# Patient Record
Sex: FEMALE | Race: WHITE | NOT HISPANIC OR LATINO | Employment: FULL TIME | URBAN - METROPOLITAN AREA
[De-identification: names, ages, dates, MRNs, and addresses within clinical notes are randomized per-mention and may not be internally consistent; named-entity substitution may affect disease eponyms.]

---

## 2023-12-21 ENCOUNTER — OFFICE VISIT (OUTPATIENT)
Dept: PRIMARY CARE | Facility: CLINIC | Age: 38
End: 2023-12-21
Payer: COMMERCIAL

## 2023-12-21 VITALS — DIASTOLIC BLOOD PRESSURE: 76 MMHG | HEART RATE: 76 BPM | SYSTOLIC BLOOD PRESSURE: 123 MMHG

## 2023-12-21 DIAGNOSIS — F41.1 GAD (GENERALIZED ANXIETY DISORDER): Primary | ICD-10-CM

## 2023-12-21 PROBLEM — Z98.82 S/P BREAST AUGMENTATION: Status: ACTIVE | Noted: 2023-12-21

## 2023-12-21 PROCEDURE — 1036F TOBACCO NON-USER: CPT | Performed by: FAMILY MEDICINE

## 2023-12-21 PROCEDURE — 99213 OFFICE O/P EST LOW 20 MIN: CPT | Performed by: FAMILY MEDICINE

## 2023-12-21 RX ORDER — VENLAFAXINE HYDROCHLORIDE 75 MG/1
75 CAPSULE, EXTENDED RELEASE ORAL DAILY
Qty: 30 CAPSULE | Refills: 0 | Status: SHIPPED | OUTPATIENT
Start: 2023-12-21 | End: 2024-01-23 | Stop reason: SDUPTHER

## 2023-12-21 NOTE — PROGRESS NOTES
Subjective   Patient ID: Courtney Ace is a 38 y.o. female who presents for anxiety    HPI   Anxiety got worse lately. Buspar did not help anxiety relief. pt denies depressed mood.  Normal appetite. No  heart palpitation or LE edema. No HA, dizziness, imbalance.    Review of Systems    Objective   /76   Pulse 76     Physical Exam  NAD, well groomed, No sclera icterus.  lungs: CTA b/l, heart: RRR, no LE edema, abd: soft, no tenderness, BS+,  Good balance. No depressed mood  Assessment/Plan   Problem List Items Addressed This Visit             ICD-10-CM    GRUPO (generalized anxiety disorder) - Primary F41.1     Start effexor. Check labs. Fu in 1 mos         Relevant Medications    venlafaxine XR (Effexor-XR) 75 mg 24 hr capsule    Other Relevant Orders    CBC    Comprehensive Metabolic Panel    TSH with reflex to Free T4 if abnormal

## 2024-01-23 ENCOUNTER — OFFICE VISIT (OUTPATIENT)
Dept: PRIMARY CARE | Facility: CLINIC | Age: 39
End: 2024-01-23
Payer: COMMERCIAL

## 2024-01-23 VITALS — DIASTOLIC BLOOD PRESSURE: 71 MMHG | SYSTOLIC BLOOD PRESSURE: 123 MMHG

## 2024-01-23 DIAGNOSIS — F41.1 GAD (GENERALIZED ANXIETY DISORDER): ICD-10-CM

## 2024-01-23 PROCEDURE — 1036F TOBACCO NON-USER: CPT | Performed by: FAMILY MEDICINE

## 2024-01-23 PROCEDURE — 99213 OFFICE O/P EST LOW 20 MIN: CPT | Performed by: FAMILY MEDICINE

## 2024-01-23 RX ORDER — VENLAFAXINE HYDROCHLORIDE 75 MG/1
75 CAPSULE, EXTENDED RELEASE ORAL DAILY
Qty: 90 CAPSULE | Refills: 0 | Status: SHIPPED | OUTPATIENT
Start: 2024-01-23 | End: 2024-01-28

## 2024-01-23 NOTE — PROGRESS NOTES
Subjective   Patient ID: Courtney Ace is a 38 y.o. female who presents for fu    HPI   pt denies anxiety or depressed mood  while on effexor. Normal appetite with good sleep now. No  heart palpitation, HA, dizziness, imbalance.    Review of Systems    Objective   /71     Physical Exam  NAD, well groomed, No sclera icterus.   lungs: CTA b/l, heart: RRR, no LE edema, abd: soft, no tenderness, BS+,  Good balance. No depressed mood    Assessment/Plan     Skinny,  well controlled with zoloft.  Cont. the same. f/u in 3 mos for cpe      .

## 2024-01-28 DIAGNOSIS — F41.1 GAD (GENERALIZED ANXIETY DISORDER): ICD-10-CM

## 2024-01-28 RX ORDER — VENLAFAXINE HYDROCHLORIDE 75 MG/1
75 CAPSULE, EXTENDED RELEASE ORAL DAILY
Qty: 90 CAPSULE | Refills: 0 | Status: SHIPPED | OUTPATIENT
Start: 2024-01-28 | End: 2024-04-23 | Stop reason: SDUPTHER

## 2024-04-23 ENCOUNTER — OFFICE VISIT (OUTPATIENT)
Dept: PRIMARY CARE | Facility: CLINIC | Age: 39
End: 2024-04-23
Payer: COMMERCIAL

## 2024-04-23 VITALS
HEART RATE: 66 BPM | RESPIRATION RATE: 14 BRPM | BODY MASS INDEX: 29.1 KG/M2 | SYSTOLIC BLOOD PRESSURE: 115 MMHG | WEIGHT: 192 LBS | DIASTOLIC BLOOD PRESSURE: 64 MMHG | HEIGHT: 68 IN

## 2024-04-23 DIAGNOSIS — R19.5 LOOSE STOOLS: ICD-10-CM

## 2024-04-23 DIAGNOSIS — Z00.00 ROUTINE MEDICAL EXAM: Primary | ICD-10-CM

## 2024-04-23 DIAGNOSIS — F41.1 GAD (GENERALIZED ANXIETY DISORDER): ICD-10-CM

## 2024-04-23 PROCEDURE — 90471 IMMUNIZATION ADMIN: CPT | Performed by: FAMILY MEDICINE

## 2024-04-23 PROCEDURE — 1036F TOBACCO NON-USER: CPT | Performed by: FAMILY MEDICINE

## 2024-04-23 PROCEDURE — 99395 PREV VISIT EST AGE 18-39: CPT | Performed by: FAMILY MEDICINE

## 2024-04-23 PROCEDURE — 90715 TDAP VACCINE 7 YRS/> IM: CPT | Performed by: FAMILY MEDICINE

## 2024-04-23 RX ORDER — VENLAFAXINE HYDROCHLORIDE 75 MG/1
75 CAPSULE, EXTENDED RELEASE ORAL DAILY
Qty: 90 CAPSULE | Refills: 0 | Status: SHIPPED | OUTPATIENT
Start: 2024-04-23

## 2024-04-23 NOTE — PROGRESS NOTES
pt has regular dental visits. no vision problems. no hearing loss.   Lifestyle: healthy diet. + weight concerns. Pt exercises regularly. no tobacco or alcohol abuse.   Safety elements used: seat belt, safe driving habits and smoke detector.   No passive smoke exposure, chemical abuse, domestic violence, anxiety symptoms, depression symptoms.  Pt has safe sexual behavior, safe driving habits. No driving violations, history of DUI.  No tuberculosis exposure.   Reproductive health: the patient is premenopausal. she reports normal menses. she uses no contraception. she is sexually active.   Cervical cancer screening:. patient has no history of an abnormal pap smear.   Review of Systems  Constitutional: no chills, no fever and no night sweats.   Eyes: no blurred vision and no eyesight problems.   ENT: no hearing loss, no nasal congestion, no nasal discharge, no hoarseness and no sore throat.   Neck: no mass(es) and no swelling.   Cardiovascular: no chest pain, no intermittent leg claudication, no lower extremity edema, no palpitations and no syncope.   Respiratory: no cough, no shortness of breath during exertion, no shortness of breath at rest and no wheezing.   Gastrointestinal: no abdominal pain, occ blood in stools, no constipation, thin or loose stools daily, no melena, no nausea, no rectal pain and no vomiting.   Genitourinary: no unexplained vaginal bleeding, no dysuria, no change in urinary frequency, no genital lesions, no hematuria, no urinary hesitancy, no incontinence, no pelvic pain, no feelings of urinary urgency and no vaginal discharge.   Musculoskeletal: no arthralgias, occ low  back pain, no localized joint pain, no myalgias and no neck pain.   Integumentary: no new skin lesions, no nipple discharge, no rashes and no skin wound.   Neurological: no confusion, no convulsions, no difficulty walking, no headache, no limb weakness, no memory changes, no numbness, no speech difficulties, no syncope and no  tingling.   Psychiatric: no anxiety, no personality change, no depression, no emotional problems, no homicidal thoughts, no anhedonia, no sleep disturbances and no substance use disorders.   Endocrine: no changes in appetite, no deepening of the voice, no polyuria, no feelings of weakness, no heat/cold intolerance, no muscle weakness, no polydipsia, no recent weight gain and no recent weight loss.   Hematologic/Lymphatic: no tendency for easy bleeding, no tendency for easy bruising, no recurrent infections and no swollen glands.     Physical Exam  Constitutional: Alert and in no acute distress. Well developed, well nourished.   Head and Face: Head and face: Normal.  Palpation of the face and sinuses: Normal.    Eyes: Normal external exam. Pupils: PERRL with normal accommodation and EOMI.   Ears, Nose, Mouth, and Throat: External inspection of ears and nose: Normal.  Hearing: Normal.  Nasal mucosa, septum, and turbinates: Normal.  Oropharynx: Normal.    Neck: No neck mass was observed. Supple. Thyroid not enlarged and there were no palpable thyroid nodules.   Cardiovascular: Heart rate and rhythm were normal, normal S1 and S2, no gallops, no murmurs and no pericardial rub. Pedal pulses: Normal. No peripheral edema.   Pulmonary: No respiratory distress. Clear bilateral breath sounds.   Chest wall: Normal.    Abdomen: Soft nontender; no abdominal mass palpated. No organomegaly. No hernias.   Musculoskeletal: Gait and station: Normal. No joint swelling seen, normal movements of all extremities. Range of motion: Normal.  Muscle strength/tone: Normal.    Skin: Normal skin color and pigmentation, normal skin turgor, and no rash. Palpation of skin and subcutaneous tissue: Normal.    Neurologic: Cranial nerves 2-12 grossly intact. Deep tendon reflexes were 2+ and symmetric at the knees. Sensation: Normal. Coordination: Normal.    Psychiatric: Judgment and insight: Intact. Alert and oriented x 3. Recent and remote memory:  Normal.  Mood and affect: Normal.   Lymphatic: No cervical lymphadenopathy. Palpation of lymph nodes in axillae: Normal.      unremarkable PE except overweight. recommend nutritionist eval. Recommend DASH diet and regular exercise. check CBC, CMP, lipid, TSH.  Advise eye exam by an OD yearly and dental exam every 6 months. will monitor lipid and weight yearly. Recommend sunscreen application if exposed to the sun when UV index is above 2.   recommend Hep C, hepB, HIV test. recommend   Tdap,  covid shot.   We will call pt regarding lab results. f/u as scheduled.

## 2024-06-18 ENCOUNTER — APPOINTMENT (OUTPATIENT)
Dept: GASTROENTEROLOGY | Facility: CLINIC | Age: 39
End: 2024-06-18
Payer: COMMERCIAL

## 2024-06-18 VITALS
BODY MASS INDEX: 28.95 KG/M2 | DIASTOLIC BLOOD PRESSURE: 78 MMHG | WEIGHT: 191 LBS | HEART RATE: 79 BPM | HEIGHT: 68 IN | SYSTOLIC BLOOD PRESSURE: 116 MMHG | OXYGEN SATURATION: 98 %

## 2024-06-18 DIAGNOSIS — R19.5 LOOSE STOOLS: ICD-10-CM

## 2024-06-18 DIAGNOSIS — K62.5 RECTAL BLEEDING: Primary | ICD-10-CM

## 2024-06-18 PROCEDURE — 99204 OFFICE O/P NEW MOD 45 MIN: CPT | Performed by: PHYSICIAN ASSISTANT

## 2024-06-18 PROCEDURE — 1036F TOBACCO NON-USER: CPT | Performed by: PHYSICIAN ASSISTANT

## 2024-06-18 RX ORDER — POLYETHYLENE GLYCOL 3350, SODIUM SULFATE ANHYDROUS, SODIUM BICARBONATE, SODIUM CHLORIDE, POTASSIUM CHLORIDE 236; 22.74; 6.74; 5.86; 2.97 G/4L; G/4L; G/4L; G/4L; G/4L
4000 POWDER, FOR SOLUTION ORAL ONCE
Qty: 4000 ML | Refills: 0 | Status: SHIPPED | OUTPATIENT
Start: 2024-06-18 | End: 2024-06-18

## 2024-06-18 NOTE — PROGRESS NOTES
Subjective   Patient ID: Courtney Ace is a 39 y.o. female who was referred by her PCP for New Patient Visit (Bm/blood in stool ).    Patient's PCP is Dr. Quintero    PMH: GRUPO    HPI  Patient states that she has been having irregular stools and rectal bleeding for the past few months. She states that generally she is having 1 BM daily. She states that it can be a skinny formed stool or watery diarrhea. She states that the diarrhea is usually larger volume. She denies nocturnal diarrhea. She states that in the past she had intermittent constipation that she used OTC colace PRN for. She has had some diffuse abdominal discomfort and bloating. She has noticed that dairy products tend to be a trigger for her symptoms. She states that the bleeding is intermittent. She states that it is bright red in the toilet and with wiping. She states that she thinks that she has hemorrhoids. She denies unexplained weight loss, dysphagia, N/V, melena, rashes, vision changes. She has never had endoscopy or GI work up before. She states that her mother had colon polyps and she has an aunt with history of rectal cancer.     PCP ordered basic labs, including TSH, CBC, and CMP, which are pending.     Prior GI evaluation:  None    Review of Systems:  Constitutional: No reported fever, chills, or weight loss.  Skin: No reported icterus, lesions, or rash.  Eye: No reported itching, pain, vision changes.  Ear: No reported discharge, hearing loss, or pain.  Nose: No reported congestion, discharge, or epistaxis.  Mouth/throat: No reported dysphagia, hoarseness, or throat pain.  Resp: No reported cough, dyspnea, or wheezing.  Cardiovascular: No reported chest pain, lower extremity edema, or palpitations.   GI: Reports abdominal pain, several months of irregular stool, and rectal bleeding.  : No reported dysuria, hematuria, or frequency.  Neuro: No reported confusion, memory loss, headaches, or dizziness.  Psych: No reported anxiety, depression, or  insomnia.  Musculoskeletal: No reported arthralgia, joint swelling, or myalgias.  Heme/lymph: No reported easy bleeding or bruising, or swollen lymph nodes.  Endocrine: No reported cold/heat intolerance, polydipsia, or polyuria.     Medications:  Prior to Admission medications    Medication Sig Start Date End Date Taking? Authorizing Provider   venlafaxine XR (Effexor-XR) 75 mg 24 hr capsule Take 1 capsule (75 mg) by mouth once daily. 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    Past Surgical History:  She has a past surgical history that includes Other surgical history (01/04/2016) and Other surgical history (03/26/2021).    Social History:  She reports that she quit smoking about 10 years ago. Her smoking use included cigarettes. She has never used smokeless tobacco. She reports that she does not currently use alcohol. She reports that she does not use drugs.    Objective   Physical exam:  Constitutional: Well developed, well  "nourished 39 y.o. year old in no acute distress.   Skin: Warm and dry. Normal turgor. No rash, ulcer, trauma, jaundice.   Eyes: Pupils symmetric and reactive to light.  Respiratory: Clear to auscultation bilaterally. No wheezes, rhonchi, or rales heard.  Cardiovascular: Regular rate and rhythm. S1 and S2 appreciated and normal. No murmur, rub, or gallop heard.   Edema: No edema noted.  GI: Normal bowel sounds. Soft, non-distended, non-tender. No rebound or guarding present. No hepatomegaly or splenomegaly appreciated. Abdominal aorta not palpably abnormal.  Musculoskeletal: Limbs and Joints grossly normal. Full range of motion in major joint.   Neuro: Alert and oriented x 3. Cranial nerves 2-12 grossly intact.   Psych: Normal mood and affect.        Relevant Results Recent labs reviewed in the EMR.  Lab Results   Component Value Date    HGB 13.7 07/23/2021    HGB 14.1 04/09/2021    MCV 95 07/23/2021     04/09/2021     07/23/2021     04/09/2021       No results found for: \"FERRITIN\", \"IRON\"    Lab Results   Component Value Date     07/23/2021    K 4.6 07/23/2021     07/23/2021    BUN 9 07/23/2021    CREATININE 0.63 07/23/2021       Lab Results   Component Value Date    BILITOT 1.1 07/23/2021     Lab Results   Component Value Date    ALT 14 07/23/2021    ALT 14 04/09/2021    AST 14 07/23/2021    AST 19 04/09/2021    ALKPHOS 64 07/23/2021    ALKPHOS 62 04/09/2021       No results found for: \"CRP\"    No results found for: \"CALPS\"    Radiology: Reviewed imaging reviewed in the EMR.  No results found.    Assessment/Plan   Problem List Items Addressed This Visit             ICD-10-CM    Rectal bleeding - Primary K62.5     Patient with red blood in the stool and with wiping. Ddx includes hemorrhoids, anal fissure, polyp/lesion. Inflammatory process possible as well. Colonoscopy ordered for further evaluation.          Relevant Medications    Golytely 236-22.74-6.74 -5.86 gram solution    " Other Relevant Orders    C. difficile, PCR    Calprotectin, Fecal    C-Reactive Protein    Pancreatic Elastase, Fecal    Thyroid Stimulating Hormone    Tissue Transglutaminase IgA    Colonoscopy Diagnostic    Loose stools R19.5     Patient with months of irregular bowel habits. Discussed that symptoms could represent IBS given background of constipation. I will evaluate for other possibilities including celiac disease, pancreatic insufficiency, infection, inflammation. Lab work and stool studies ordered along with colonoscopy. Recommended daily fiber supplement.         Relevant Medications    Golytely 236-22.74-6.74 -5.86 gram solution    Other Relevant Orders    C. difficile, PCR    Calprotectin, Fecal    C-Reactive Protein    Pancreatic Elastase, Fecal    Thyroid Stimulating Hormone    Tissue Transglutaminase IgA    Colonoscopy Diagnostic         Mariola Holland PA-C

## 2024-06-18 NOTE — ASSESSMENT & PLAN NOTE
Patient with months of irregular bowel habits. Discussed that symptoms could represent IBS given background of constipation. I will evaluate for other possibilities including celiac disease, pancreatic insufficiency, infection, inflammation. Lab work and stool studies ordered along with colonoscopy. Recommended daily fiber supplement.

## 2024-06-18 NOTE — ASSESSMENT & PLAN NOTE
Patient with red blood in the stool and with wiping. Ddx includes hemorrhoids, anal fissure, polyp/lesion. Inflammatory process possible as well. Colonoscopy ordered for further evaluation.

## 2024-06-18 NOTE — PATIENT INSTRUCTIONS
Thank you for coming in for your appointment.   -Get labs and stool studies done  -Get colonoscopy done  -Try daily fiber supplement, such as metamucil or benefiber    Call [phone] with questions or concerns.

## 2024-06-26 ENCOUNTER — HOSPITAL ENCOUNTER (OUTPATIENT)
Dept: GASTROENTEROLOGY | Facility: HOSPITAL | Age: 39
Discharge: HOME | End: 2024-06-26
Payer: COMMERCIAL

## 2024-06-26 ENCOUNTER — ANESTHESIA (OUTPATIENT)
Dept: GASTROENTEROLOGY | Facility: HOSPITAL | Age: 39
End: 2024-06-26
Payer: COMMERCIAL

## 2024-06-26 ENCOUNTER — ANESTHESIA EVENT (OUTPATIENT)
Dept: GASTROENTEROLOGY | Facility: HOSPITAL | Age: 39
End: 2024-06-26
Payer: COMMERCIAL

## 2024-06-26 VITALS
SYSTOLIC BLOOD PRESSURE: 100 MMHG | TEMPERATURE: 97.2 F | OXYGEN SATURATION: 99 % | DIASTOLIC BLOOD PRESSURE: 72 MMHG | HEART RATE: 75 BPM | RESPIRATION RATE: 14 BRPM

## 2024-06-26 DIAGNOSIS — K62.5 RECTAL BLEEDING: ICD-10-CM

## 2024-06-26 DIAGNOSIS — R19.5 LOOSE STOOLS: Primary | ICD-10-CM

## 2024-06-26 PROCEDURE — 7100000010 HC PHASE TWO TIME - EACH INCREMENTAL 1 MINUTE

## 2024-06-26 PROCEDURE — 2500000005 HC RX 250 GENERAL PHARMACY W/O HCPCS: Performed by: NURSE ANESTHETIST, CERTIFIED REGISTERED

## 2024-06-26 PROCEDURE — 45378 DIAGNOSTIC COLONOSCOPY: CPT | Performed by: INTERNAL MEDICINE

## 2024-06-26 PROCEDURE — 7100000009 HC PHASE TWO TIME - INITIAL BASE CHARGE

## 2024-06-26 PROCEDURE — 3700000002 HC GENERAL ANESTHESIA TIME - EACH INCREMENTAL 1 MINUTE

## 2024-06-26 PROCEDURE — 2500000004 HC RX 250 GENERAL PHARMACY W/ HCPCS (ALT 636 FOR OP/ED): Performed by: NURSE ANESTHETIST, CERTIFIED REGISTERED

## 2024-06-26 PROCEDURE — 3700000001 HC GENERAL ANESTHESIA TIME - INITIAL BASE CHARGE

## 2024-06-26 RX ORDER — PROPOFOL 10 MG/ML
INJECTION, EMULSION INTRAVENOUS AS NEEDED
Status: DISCONTINUED | OUTPATIENT
Start: 2024-06-26 | End: 2024-06-26

## 2024-06-26 RX ORDER — SODIUM CHLORIDE 9 MG/ML
20 INJECTION, SOLUTION INTRAVENOUS CONTINUOUS
Status: DISCONTINUED | OUTPATIENT
Start: 2024-06-26 | End: 2024-06-27 | Stop reason: HOSPADM

## 2024-06-26 RX ORDER — SODIUM CHLORIDE 9 MG/ML
INJECTION, SOLUTION INTRAVENOUS CONTINUOUS PRN
Status: DISCONTINUED | OUTPATIENT
Start: 2024-06-26 | End: 2024-06-26

## 2024-06-26 RX ORDER — LIDOCAINE HYDROCHLORIDE 20 MG/ML
INJECTION, SOLUTION INFILTRATION; PERINEURAL AS NEEDED
Status: DISCONTINUED | OUTPATIENT
Start: 2024-06-26 | End: 2024-06-26

## 2024-06-26 RX ORDER — FENTANYL CITRATE 50 UG/ML
INJECTION, SOLUTION INTRAMUSCULAR; INTRAVENOUS AS NEEDED
Status: DISCONTINUED | OUTPATIENT
Start: 2024-06-26 | End: 2024-06-26

## 2024-06-26 SDOH — HEALTH STABILITY: MENTAL HEALTH: CURRENT SMOKER: 0

## 2024-06-26 ASSESSMENT — COLUMBIA-SUICIDE SEVERITY RATING SCALE - C-SSRS
1. IN THE PAST MONTH, HAVE YOU WISHED YOU WERE DEAD OR WISHED YOU COULD GO TO SLEEP AND NOT WAKE UP?: NO
6. HAVE YOU EVER DONE ANYTHING, STARTED TO DO ANYTHING, OR PREPARED TO DO ANYTHING TO END YOUR LIFE?: NO
2. HAVE YOU ACTUALLY HAD ANY THOUGHTS OF KILLING YOURSELF?: NO

## 2024-06-26 ASSESSMENT — PAIN SCALES - GENERAL
PAINLEVEL_OUTOF10: 0 - NO PAIN
PAINLEVEL_OUTOF10: 0 - NO PAIN
PAIN_LEVEL: 0
PAINLEVEL_OUTOF10: 2
PAINLEVEL_OUTOF10: 0 - NO PAIN

## 2024-06-26 ASSESSMENT — PAIN - FUNCTIONAL ASSESSMENT: PAIN_FUNCTIONAL_ASSESSMENT: 0-10

## 2024-06-26 NOTE — ANESTHESIA POSTPROCEDURE EVALUATION
Patient: Courtney Ace    Procedure Summary       Date: 06/26/24 Room / Location: [facility]    Anesthesia Start: 1048 Anesthesia Stop: 1127    Procedure: COLONOSCOPY Diagnosis:       Loose stools      Rectal bleeding    Scheduled Providers: Hunter Burris MD Responsible Provider: ARLETH Montero    Anesthesia Type: MAC ASA Status: 2            Anesthesia Type: MAC    Vitals Value Taken Time   /68 06/26/24 1127   Temp 36.1 °C (97 °F) 06/26/24 1127   Pulse 81 06/26/24 1127   Resp 16 06/26/24 1127   SpO2 98 % 06/26/24 1127       Anesthesia Post Evaluation    Patient location during evaluation: bedside  Patient participation: complete - patient participated  Level of consciousness: awake and alert  Pain score: 0  Pain management: adequate  Airway patency: patent  Cardiovascular status: acceptable and hemodynamically stable  Respiratory status: acceptable  Hydration status: acceptable  Postoperative Nausea and Vomiting: none    There were no known notable events for this encounter.

## 2024-06-26 NOTE — H&P
History Of Present Illness  Courtney Ace is a 39 y.o. female presenting with complaints of change in bowel movement.  Diarrhea alternating with formed stools.  She has also noted occasional blood in stool.  This is associated with abdominal discomfort but not gertrude pain.  There is no report of fever.  She denied weight loss nausea or vomiting.  Family history is notable for: Polyp in her mother and colon cancer in 1 aunt..     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    Surgical History  She has a past surgical history that includes Other surgical history (01/04/2016) and Other surgical history (03/26/2021).     Social History  She reports that she quit smoking about 10 years ago. Her smoking use included cigarettes. She has never used smokeless tobacco. She reports that she does not currently use alcohol. She reports that she does not use drugs.    Family History  Family History   Problem Relation Name Age of Onset    No Known Problems Mother      No Known Problems Father      Colon cancer  Mother's Sister          Allergies  Bupropion    Review of Systems  Constitutional: no fever, no chills, fatigue,  not feeling tired and no recent weight loss. No reports of dizziness.  SKIN: No skin rash or lesions  EYE: No pain photophobia, diplopia or blurred vision  EAR: No discharge, pain or hearing loss  NOSE: No congestion, epistaxis or obstruction  RESPIRATORY: No cough , dyspnea or  chest pain   CV: No chest pain, lower extremity swelling or palpitations  GE: No abdominal pain, nausea, vomiting, dysphagia or odynophagia. Denied constipation , diarrhea  : No dysuria or hematuria, urinary incontinence or urine frequency  NEURO: Denied weakness, confusion, dizziness, or numbness   PSYCH : Denies anxiety, hallucinations or insomnia  HEME/ LYMPH : Denied bleeding , bruising or enlarged nodes  ENDOCRINE: No change in weight, polydipsia, or abnormal bleeding  .     Physical Exam  Head and neck examinations were  unremarkable. There was no temporal wasting. No jaundice noted. No thyromegaly.  No carotid bruits.  There is no peripheral lymphadenopathy.  Lungs were clear to auscultation. There when no rales, rhonchi or wheezes.  Cardiac examination revealed normal heart sounds. Rhythm was sinus . There were no murmurs.  Abdomen was full and soft. There was no organomegaly. Bowel sounds were normo- active. There was no ascites. The abdomen was nontender.  Rectal examination was not done.  Extremities: No edema or joint swelling.  Neurological examination: Patient was alert, oriented in person place, and time. There when no focal neurological signs. Cranial nerves were grossly intact. No evidence of encephalopathy. There was no asterixis. The  plantar response was flexor.    Last Recorded Vitals  Blood pressure 114/78, pulse 84, temperature 36.6 °C (97.9 °F), temperature source Temporal, resp. rate 16, last menstrual period 05/25/2024, SpO2 98%.    Relevant Results             Assessment/Plan  36-year-old lady with  change in bowel action with diarrhea alternating with formed bowel movements.  She has noted blood in stool and some abdominal discomfort.  Family history is notable for colon cancer and 1 aunt and colon polyps in her mother.  Proceed with colonoscopy today as planned.  Hunter Burris MD

## 2024-06-26 NOTE — ANESTHESIA PREPROCEDURE EVALUATION
Patient: Courtney Ace    Procedure Information       Date/Time: 06/26/24 1015    Scheduled providers: Hunter Burris MD    Procedure: COLONOSCOPY    Location: [facility]            Relevant Problems   Anesthesia (within normal limits)      Cardiac (within normal limits)      Pulmonary (within normal limits)      Neuro   (+) GRUPO (generalized anxiety disorder)      GI   (+) Rectal bleeding      /Renal (within normal limits)      Liver (within normal limits)      Endocrine (within normal limits)      Hematology (within normal limits)      HEENT (within normal limits)       Clinical information reviewed:   Tobacco  Allergies  Meds   Med Hx  Surg Hx  OB Status  Fam Hx  Soc   Hx        NPO Detail:  NPO/Void Status  Carbohydrate Drink Given Prior to Surgery? : N  Date of Last Liquid: 06/26/24  Time of Last Liquid: 0800  Date of Last Solid: 06/24/24  Time of Last Solid: 2000  Last Intake Type: Clear fluids  Time of Last Void: 0932         Physical Exam    Airway  Mallampati: II  TM distance: >3 FB  Neck ROM: full     Cardiovascular - normal exam  Rhythm: regular     Dental - normal exam     Pulmonary - normal exam     Abdominal - normal exam         Anesthesia Plan    History of general anesthesia?: yes  History of complications of general anesthesia?: no    ASA 2     MAC     The patient is not a current smoker.    intravenous induction   Anesthetic plan and risks discussed with patient.

## 2024-07-12 LAB
LABORATORY COMMENT REPORT: NORMAL
PATH REPORT.FINAL DX SPEC: NORMAL
PATH REPORT.GROSS SPEC: NORMAL
PATH REPORT.RELEVANT HX SPEC: NORMAL
PATH REPORT.TOTAL CANCER: NORMAL

## 2024-07-23 ENCOUNTER — APPOINTMENT (OUTPATIENT)
Dept: PRIMARY CARE | Facility: CLINIC | Age: 39
End: 2024-07-23
Payer: COMMERCIAL

## 2024-09-27 ENCOUNTER — APPOINTMENT (OUTPATIENT)
Dept: RADIOLOGY | Facility: HOSPITAL | Age: 39
End: 2024-09-27
Payer: COMMERCIAL

## 2024-09-27 ENCOUNTER — HOSPITAL ENCOUNTER (EMERGENCY)
Facility: HOSPITAL | Age: 39
Discharge: HOME | End: 2024-09-27
Attending: STUDENT IN AN ORGANIZED HEALTH CARE EDUCATION/TRAINING PROGRAM
Payer: COMMERCIAL

## 2024-09-27 VITALS
HEIGHT: 68 IN | BODY MASS INDEX: 28.79 KG/M2 | DIASTOLIC BLOOD PRESSURE: 87 MMHG | HEART RATE: 86 BPM | OXYGEN SATURATION: 100 % | WEIGHT: 190 LBS | TEMPERATURE: 97 F | RESPIRATION RATE: 16 BRPM | SYSTOLIC BLOOD PRESSURE: 132 MMHG

## 2024-09-27 DIAGNOSIS — S82.851A CLOSED TRIMALLEOLAR FRACTURE OF RIGHT ANKLE, INITIAL ENCOUNTER: Primary | ICD-10-CM

## 2024-09-27 PROCEDURE — 2500000004 HC RX 250 GENERAL PHARMACY W/ HCPCS (ALT 636 FOR OP/ED)

## 2024-09-27 PROCEDURE — 2500000004 HC RX 250 GENERAL PHARMACY W/ HCPCS (ALT 636 FOR OP/ED): Performed by: STUDENT IN AN ORGANIZED HEALTH CARE EDUCATION/TRAINING PROGRAM

## 2024-09-27 PROCEDURE — 99285 EMERGENCY DEPT VISIT HI MDM: CPT | Mod: 25

## 2024-09-27 PROCEDURE — 73610 X-RAY EXAM OF ANKLE: CPT | Mod: RT

## 2024-09-27 PROCEDURE — 73610 X-RAY EXAM OF ANKLE: CPT | Mod: RIGHT SIDE | Performed by: RADIOLOGY

## 2024-09-27 PROCEDURE — 94681 O2 UPTK CO2 OUTP % O2 XTRC: CPT

## 2024-09-27 PROCEDURE — 27840 TREAT ANKLE DISLOCATION: CPT | Performed by: STUDENT IN AN ORGANIZED HEALTH CARE EDUCATION/TRAINING PROGRAM

## 2024-09-27 PROCEDURE — 27818 TREATMENT OF ANKLE FRACTURE: CPT | Mod: RT

## 2024-09-27 RX ORDER — ACETAMINOPHEN 500 MG
1000 TABLET ORAL EVERY 6 HOURS PRN
Qty: 30 TABLET | Refills: 0 | Status: SHIPPED | OUTPATIENT
Start: 2024-09-27 | End: 2024-10-02 | Stop reason: HOSPADM

## 2024-09-27 RX ORDER — NAPROXEN 500 MG/1
500 TABLET ORAL
Qty: 30 TABLET | Refills: 0 | Status: SHIPPED | OUTPATIENT
Start: 2024-09-27 | End: 2024-10-02 | Stop reason: HOSPADM

## 2024-09-27 RX ORDER — PROPOFOL 10 MG/ML
INJECTION, EMULSION INTRAVENOUS
Status: COMPLETED
Start: 2024-09-27 | End: 2024-09-27

## 2024-09-27 RX ORDER — FENTANYL CITRATE 50 UG/ML
INJECTION, SOLUTION INTRAMUSCULAR; INTRAVENOUS
Status: COMPLETED
Start: 2024-09-27 | End: 2024-09-27

## 2024-09-27 RX ORDER — KETAMINE HYDROCHLORIDE 50 MG/ML
45 INJECTION, SOLUTION INTRAMUSCULAR; INTRAVENOUS ONCE
Status: COMPLETED | OUTPATIENT
Start: 2024-09-27 | End: 2024-09-27

## 2024-09-27 RX ORDER — OXYCODONE HYDROCHLORIDE 5 MG/1
5 TABLET ORAL EVERY 6 HOURS PRN
Qty: 12 TABLET | Refills: 0 | Status: SHIPPED | OUTPATIENT
Start: 2024-09-27 | End: 2024-10-02 | Stop reason: HOSPADM

## 2024-09-27 RX ORDER — FENTANYL CITRATE 50 UG/ML
50 INJECTION, SOLUTION INTRAMUSCULAR; INTRAVENOUS ONCE
Status: COMPLETED | OUTPATIENT
Start: 2024-09-27 | End: 2024-09-27

## 2024-09-27 RX ADMIN — FENTANYL CITRATE 50 MCG: 50 INJECTION, SOLUTION INTRAMUSCULAR; INTRAVENOUS at 19:39

## 2024-09-27 RX ADMIN — KETAMINE HYDROCHLORIDE 45 MG: 50 INJECTION INTRAMUSCULAR; INTRAVENOUS at 20:13

## 2024-09-27 RX ADMIN — PROPOFOL 50 MG: 10 INJECTION, EMULSION INTRAVENOUS at 20:15

## 2024-09-27 RX ADMIN — FENTANYL CITRATE 50 MCG: 50 INJECTION INTRAMUSCULAR; INTRAVENOUS at 19:39

## 2024-09-27 ASSESSMENT — PAIN SCALES - GENERAL
PAINLEVEL_OUTOF10: 9
PAINLEVEL_OUTOF10: 9
PAINLEVEL_OUTOF10: 8

## 2024-09-27 ASSESSMENT — PAIN DESCRIPTION - PAIN TYPE: TYPE: ACUTE PAIN

## 2024-09-27 ASSESSMENT — COLUMBIA-SUICIDE SEVERITY RATING SCALE - C-SSRS
6. HAVE YOU EVER DONE ANYTHING, STARTED TO DO ANYTHING, OR PREPARED TO DO ANYTHING TO END YOUR LIFE?: NO
1. IN THE PAST MONTH, HAVE YOU WISHED YOU WERE DEAD OR WISHED YOU COULD GO TO SLEEP AND NOT WAKE UP?: NO
2. HAVE YOU ACTUALLY HAD ANY THOUGHTS OF KILLING YOURSELF?: NO

## 2024-09-27 ASSESSMENT — PAIN DESCRIPTION - DESCRIPTORS: DESCRIPTORS: SPASM

## 2024-09-27 ASSESSMENT — PAIN DESCRIPTION - LOCATION: LOCATION: ANKLE

## 2024-09-27 ASSESSMENT — PAIN DESCRIPTION - ORIENTATION: ORIENTATION: RIGHT

## 2024-09-27 ASSESSMENT — LIFESTYLE VARIABLES
HAVE YOU EVER FELT YOU SHOULD CUT DOWN ON YOUR DRINKING: NO
EVER HAD A DRINK FIRST THING IN THE MORNING TO STEADY YOUR NERVES TO GET RID OF A HANGOVER: NO
HAVE PEOPLE ANNOYED YOU BY CRITICIZING YOUR DRINKING: NO
EVER FELT BAD OR GUILTY ABOUT YOUR DRINKING: NO
TOTAL SCORE: 0

## 2024-09-27 ASSESSMENT — PAIN - FUNCTIONAL ASSESSMENT: PAIN_FUNCTIONAL_ASSESSMENT: 0-10
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
## 2024-09-28 NOTE — DISCHARGE INSTRUCTIONS
Take pain meds as prescribed.  Stay nonweightbearing on the right leg.  Follow-up with Dr. Canada next week.  Come back to ED for any new or worsening symptoms.

## 2024-10-01 ENCOUNTER — PREP FOR PROCEDURE (OUTPATIENT)
Dept: ORTHOPEDIC SURGERY | Facility: HOSPITAL | Age: 39
End: 2024-10-01

## 2024-10-01 ENCOUNTER — OFFICE VISIT (OUTPATIENT)
Dept: ORTHOPEDIC SURGERY | Facility: HOSPITAL | Age: 39
End: 2024-10-01
Payer: COMMERCIAL

## 2024-10-01 VITALS — HEIGHT: 68 IN | WEIGHT: 190 LBS | BODY MASS INDEX: 28.79 KG/M2

## 2024-10-01 DIAGNOSIS — S82.851A CLOSED TRIMALLEOLAR FRACTURE OF RIGHT ANKLE, INITIAL ENCOUNTER: ICD-10-CM

## 2024-10-01 DIAGNOSIS — S82.851A CLOSED TRIMALLEOLAR FRACTURE OF RIGHT ANKLE, INITIAL ENCOUNTER: Primary | ICD-10-CM

## 2024-10-01 PROCEDURE — 1036F TOBACCO NON-USER: CPT | Performed by: SPECIALIST

## 2024-10-01 PROCEDURE — 3008F BODY MASS INDEX DOCD: CPT | Performed by: SPECIALIST

## 2024-10-01 PROCEDURE — 99204 OFFICE O/P NEW MOD 45 MIN: CPT | Performed by: SPECIALIST

## 2024-10-01 PROCEDURE — 99214 OFFICE O/P EST MOD 30 MIN: CPT | Performed by: SPECIALIST
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
## 2024-10-02 ENCOUNTER — ANESTHESIA (OUTPATIENT)
Dept: OPERATING ROOM | Facility: HOSPITAL | Age: 39
End: 2024-10-02
Payer: COMMERCIAL

## 2024-10-02 ENCOUNTER — ANESTHESIA EVENT (OUTPATIENT)
Dept: OPERATING ROOM | Facility: HOSPITAL | Age: 39
End: 2024-10-02
Payer: COMMERCIAL

## 2024-10-02 ENCOUNTER — HOSPITAL ENCOUNTER (OUTPATIENT)
Facility: HOSPITAL | Age: 39
Setting detail: OUTPATIENT SURGERY
Discharge: HOME | End: 2024-10-02
Attending: SPECIALIST | Admitting: SPECIALIST
Payer: COMMERCIAL

## 2024-10-02 ENCOUNTER — APPOINTMENT (OUTPATIENT)
Dept: RADIOLOGY | Facility: HOSPITAL | Age: 39
End: 2024-10-02
Payer: COMMERCIAL

## 2024-10-02 ENCOUNTER — PHARMACY VISIT (OUTPATIENT)
Dept: PHARMACY | Facility: CLINIC | Age: 39
End: 2024-10-02
Payer: COMMERCIAL

## 2024-10-02 VITALS
TEMPERATURE: 97.7 F | RESPIRATION RATE: 20 BRPM | OXYGEN SATURATION: 97 % | SYSTOLIC BLOOD PRESSURE: 112 MMHG | WEIGHT: 190 LBS | HEART RATE: 72 BPM | DIASTOLIC BLOOD PRESSURE: 73 MMHG | BODY MASS INDEX: 28.79 KG/M2 | HEIGHT: 68 IN

## 2024-10-02 DIAGNOSIS — S82.851A CLOSED TRIMALLEOLAR FRACTURE OF RIGHT ANKLE, INITIAL ENCOUNTER: Primary | ICD-10-CM

## 2024-10-02 PROBLEM — R11.2 PONV (POSTOPERATIVE NAUSEA AND VOMITING): Status: ACTIVE | Noted: 2024-10-02

## 2024-10-02 PROBLEM — Z98.890 PONV (POSTOPERATIVE NAUSEA AND VOMITING): Status: ACTIVE | Noted: 2024-10-02

## 2024-10-02 LAB
ERYTHROCYTE [DISTWIDTH] IN BLOOD BY AUTOMATED COUNT: 12.1 % (ref 11.5–14.5)
HCT VFR BLD AUTO: 41.8 % (ref 36–46)
HGB BLD-MCNC: 13.8 G/DL (ref 12–16)
MCH RBC QN AUTO: 30.4 PG (ref 26–34)
MCHC RBC AUTO-ENTMCNC: 33 G/DL (ref 32–36)
MCV RBC AUTO: 92 FL (ref 80–100)
NRBC BLD-RTO: 0 /100 WBCS (ref 0–0)
PLATELET # BLD AUTO: 302 X10*3/UL (ref 150–450)
PREGNANCY TEST URINE, POC: NEGATIVE
RBC # BLD AUTO: 4.54 X10*6/UL (ref 4–5.2)
WBC # BLD AUTO: 5 X10*3/UL (ref 4.4–11.3)

## 2024-10-02 PROCEDURE — 76000 FLUOROSCOPY <1 HR PHYS/QHP: CPT

## 2024-10-02 PROCEDURE — 7100000010 HC PHASE TWO TIME - EACH INCREMENTAL 1 MINUTE: Performed by: SPECIALIST

## 2024-10-02 PROCEDURE — 81025 URINE PREGNANCY TEST: CPT | Performed by: ANESTHESIOLOGY

## 2024-10-02 PROCEDURE — RXMED WILLOW AMBULATORY MEDICATION CHARGE

## 2024-10-02 PROCEDURE — 2500000004 HC RX 250 GENERAL PHARMACY W/ HCPCS (ALT 636 FOR OP/ED): Performed by: ANESTHESIOLOGY

## 2024-10-02 PROCEDURE — 7100000009 HC PHASE TWO TIME - INITIAL BASE CHARGE: Performed by: SPECIALIST

## 2024-10-02 PROCEDURE — C1769 GUIDE WIRE: HCPCS | Performed by: SPECIALIST

## 2024-10-02 PROCEDURE — C1713 ANCHOR/SCREW BN/BN,TIS/BN: HCPCS | Performed by: SPECIALIST

## 2024-10-02 PROCEDURE — 2500000001 HC RX 250 WO HCPCS SELF ADMINISTERED DRUGS (ALT 637 FOR MEDICARE OP): Performed by: ANESTHESIOLOGY

## 2024-10-02 PROCEDURE — 3700000002 HC GENERAL ANESTHESIA TIME - EACH INCREMENTAL 1 MINUTE: Performed by: SPECIALIST

## 2024-10-02 PROCEDURE — 2720000007 HC OR 272 NO HCPCS: Performed by: SPECIALIST

## 2024-10-02 PROCEDURE — 2500000004 HC RX 250 GENERAL PHARMACY W/ HCPCS (ALT 636 FOR OP/ED): Performed by: NURSE ANESTHETIST, CERTIFIED REGISTERED

## 2024-10-02 PROCEDURE — 27822 TREATMENT OF ANKLE FRACTURE: CPT | Performed by: SPECIALIST

## 2024-10-02 PROCEDURE — 3600000009 HC OR TIME - EACH INCREMENTAL 1 MINUTE - PROCEDURE LEVEL FOUR: Performed by: SPECIALIST

## 2024-10-02 PROCEDURE — 85027 COMPLETE CBC AUTOMATED: CPT | Performed by: ANESTHESIOLOGY

## 2024-10-02 PROCEDURE — 36415 COLL VENOUS BLD VENIPUNCTURE: CPT | Performed by: ANESTHESIOLOGY

## 2024-10-02 PROCEDURE — 2500000004 HC RX 250 GENERAL PHARMACY W/ HCPCS (ALT 636 FOR OP/ED): Mod: JZ | Performed by: SPECIALIST

## 2024-10-02 PROCEDURE — 3600000004 HC OR TIME - INITIAL BASE CHARGE - PROCEDURE LEVEL FOUR: Performed by: SPECIALIST

## 2024-10-02 PROCEDURE — 2780000003 HC OR 278 NO HCPCS: Performed by: SPECIALIST

## 2024-10-02 PROCEDURE — 2500000004 HC RX 250 GENERAL PHARMACY W/ HCPCS (ALT 636 FOR OP/ED): Performed by: SPECIALIST

## 2024-10-02 PROCEDURE — 2500000005 HC RX 250 GENERAL PHARMACY W/O HCPCS: Performed by: ANESTHESIOLOGY

## 2024-10-02 PROCEDURE — 3700000001 HC GENERAL ANESTHESIA TIME - INITIAL BASE CHARGE: Performed by: SPECIALIST

## 2024-10-02 PROCEDURE — 96372 THER/PROPH/DIAG INJ SC/IM: CPT | Performed by: NURSE ANESTHETIST, CERTIFIED REGISTERED

## 2024-10-02 DEVICE — SCREW LOCKING 3.5 W/RECESS 12: Type: IMPLANTABLE DEVICE | Site: ANKLE | Status: FUNCTIONAL

## 2024-10-02 DEVICE — SCREW, CORTICAL, SELF-TAPPING, 3.5 X 12 MM, STAINLESS STEEL: Type: IMPLANTABLE DEVICE | Site: ANKLE | Status: FUNCTIONAL

## 2024-10-02 DEVICE — PLATE LCP TUBULAR 1/3 81MM 7H: Type: IMPLANTABLE DEVICE | Site: ANKLE | Status: FUNCTIONAL

## 2024-10-02 DEVICE — SCREW, CORTICAL, SELF-TAPPING, 3.5 X 16 MM, STAINLESS STEEL: Type: IMPLANTABLE DEVICE | Site: ANKLE | Status: FUNCTIONAL

## 2024-10-02 DEVICE — SCREW LOCKING 3.5 W/RECESS 16: Type: IMPLANTABLE DEVICE | Site: ANKLE | Status: FUNCTIONAL

## 2024-10-02 DEVICE — SCREW, CANNULATED, LONG THREAD, 4 X 44 MM, STAINLESS STEEL: Type: IMPLANTABLE DEVICE | Site: ANKLE | Status: FUNCTIONAL

## 2024-10-02 DEVICE — SCREW, CANCELLOUS, FULL THREAD, 4 X 18 MM, STAINLESS STEEL: Type: IMPLANTABLE DEVICE | Site: ANKLE | Status: FUNCTIONAL

## 2024-10-02 RX ORDER — LIDOCAINE HYDROCHLORIDE AND EPINEPHRINE 10; 10 MG/ML; UG/ML
INJECTION, SOLUTION INFILTRATION; PERINEURAL AS NEEDED
Status: DISCONTINUED | OUTPATIENT
Start: 2024-10-02 | End: 2024-10-02

## 2024-10-02 RX ORDER — BUPIVACAINE HYDROCHLORIDE 2.5 MG/ML
INJECTION, SOLUTION EPIDURAL; INFILTRATION; INTRACAUDAL AS NEEDED
Status: DISCONTINUED | OUTPATIENT
Start: 2024-10-02 | End: 2024-10-02

## 2024-10-02 RX ORDER — SODIUM CITRATE AND CITRIC ACID MONOHYDRATE 334; 500 MG/5ML; MG/5ML
30 SOLUTION ORAL ONCE
Status: COMPLETED | OUTPATIENT
Start: 2024-10-02 | End: 2024-10-02

## 2024-10-02 RX ORDER — MIDAZOLAM HYDROCHLORIDE 1 MG/ML
INJECTION, SOLUTION INTRAMUSCULAR; INTRAVENOUS AS NEEDED
Status: DISCONTINUED | OUTPATIENT
Start: 2024-10-02 | End: 2024-10-02

## 2024-10-02 RX ORDER — ONDANSETRON HYDROCHLORIDE 2 MG/ML
4 INJECTION, SOLUTION INTRAVENOUS ONCE AS NEEDED
Status: DISCONTINUED | OUTPATIENT
Start: 2024-10-02 | End: 2024-10-02 | Stop reason: HOSPADM

## 2024-10-02 RX ORDER — BUPIVACAINE HYDROCHLORIDE 5 MG/ML
INJECTION, SOLUTION PERINEURAL AS NEEDED
Status: DISCONTINUED | OUTPATIENT
Start: 2024-10-02 | End: 2024-10-02 | Stop reason: HOSPADM

## 2024-10-02 RX ORDER — METOCLOPRAMIDE HYDROCHLORIDE 5 MG/ML
10 INJECTION INTRAMUSCULAR; INTRAVENOUS ONCE
Status: COMPLETED | OUTPATIENT
Start: 2024-10-02 | End: 2024-10-02

## 2024-10-02 RX ORDER — SODIUM CHLORIDE, SODIUM LACTATE, POTASSIUM CHLORIDE, CALCIUM CHLORIDE 600; 310; 30; 20 MG/100ML; MG/100ML; MG/100ML; MG/100ML
20 INJECTION, SOLUTION INTRAVENOUS CONTINUOUS
Status: DISCONTINUED | OUTPATIENT
Start: 2024-10-02 | End: 2024-10-02 | Stop reason: HOSPADM

## 2024-10-02 RX ORDER — NAPROXEN SODIUM 220 MG/1
81 TABLET, FILM COATED ORAL 2 TIMES DAILY
Qty: 60 TABLET | Refills: 0 | Status: SHIPPED | OUTPATIENT
Start: 2024-10-02

## 2024-10-02 RX ORDER — FAMOTIDINE 10 MG/ML
20 INJECTION INTRAVENOUS ONCE
Status: COMPLETED | OUTPATIENT
Start: 2024-10-02 | End: 2024-10-02

## 2024-10-02 RX ORDER — PROPOFOL 10 MG/ML
INJECTION, EMULSION INTRAVENOUS AS NEEDED
Status: DISCONTINUED | OUTPATIENT
Start: 2024-10-02 | End: 2024-10-02

## 2024-10-02 RX ORDER — CEFAZOLIN SODIUM 2 G/100ML
2 INJECTION, SOLUTION INTRAVENOUS ONCE
Status: COMPLETED | OUTPATIENT
Start: 2024-10-02 | End: 2024-10-02

## 2024-10-02 RX ORDER — FENTANYL CITRATE 50 UG/ML
INJECTION, SOLUTION INTRAMUSCULAR; INTRAVENOUS AS NEEDED
Status: DISCONTINUED | OUTPATIENT
Start: 2024-10-02 | End: 2024-10-02

## 2024-10-02 RX ORDER — ALBUTEROL SULFATE 0.83 MG/ML
2.5 SOLUTION RESPIRATORY (INHALATION) ONCE
Status: DISCONTINUED | OUTPATIENT
Start: 2024-10-02 | End: 2024-10-02 | Stop reason: HOSPADM

## 2024-10-02 RX ORDER — ONDANSETRON HYDROCHLORIDE 2 MG/ML
4 INJECTION, SOLUTION INTRAVENOUS ONCE
Status: COMPLETED | OUTPATIENT
Start: 2024-10-02 | End: 2024-10-02

## 2024-10-02 RX ORDER — LIDOCAINE HYDROCHLORIDE 20 MG/ML
INJECTION, SOLUTION INFILTRATION; PERINEURAL AS NEEDED
Status: DISCONTINUED | OUTPATIENT
Start: 2024-10-02 | End: 2024-10-02

## 2024-10-02 RX ORDER — KETOROLAC TROMETHAMINE 30 MG/ML
INJECTION, SOLUTION INTRAMUSCULAR; INTRAVENOUS AS NEEDED
Status: DISCONTINUED | OUTPATIENT
Start: 2024-10-02 | End: 2024-10-02

## 2024-10-02 RX ORDER — HYDROCODONE BITARTRATE AND ACETAMINOPHEN 5; 325 MG/1; MG/1
2 TABLET ORAL EVERY 6 HOURS PRN
Qty: 30 TABLET | Refills: 0 | Status: SHIPPED | OUTPATIENT
Start: 2024-10-02

## 2024-10-02 RX ORDER — HYDROMORPHONE HYDROCHLORIDE 0.2 MG/ML
0.2 INJECTION INTRAMUSCULAR; INTRAVENOUS; SUBCUTANEOUS EVERY 5 MIN PRN
Status: DISCONTINUED | OUTPATIENT
Start: 2024-10-02 | End: 2024-10-02 | Stop reason: HOSPADM

## 2024-10-02 SDOH — HEALTH STABILITY: MENTAL HEALTH: CURRENT SMOKER: 0

## 2024-10-02 ASSESSMENT — COLUMBIA-SUICIDE SEVERITY RATING SCALE - C-SSRS
1. IN THE PAST MONTH, HAVE YOU WISHED YOU WERE DEAD OR WISHED YOU COULD GO TO SLEEP AND NOT WAKE UP?: NO
2. HAVE YOU ACTUALLY HAD ANY THOUGHTS OF KILLING YOURSELF?: NO
6. HAVE YOU EVER DONE ANYTHING, STARTED TO DO ANYTHING, OR PREPARED TO DO ANYTHING TO END YOUR LIFE?: NO

## 2024-10-02 ASSESSMENT — PAIN SCALES - GENERAL
PAINLEVEL_OUTOF10: 0 - NO PAIN
PAINLEVEL_OUTOF10: 0 - NO PAIN
PAINLEVEL_OUTOF10: 9
PAINLEVEL_OUTOF10: 0 - NO PAIN
PAINLEVEL_OUTOF10: 0 - NO PAIN
PAIN_LEVEL: 0
PAINLEVEL_OUTOF10: 0 - NO PAIN

## 2024-10-02 ASSESSMENT — PAIN - FUNCTIONAL ASSESSMENT
PAIN_FUNCTIONAL_ASSESSMENT: 0-10

## 2024-10-02 NOTE — OP NOTE
RIGHT ANKLE OPEN REDUCTION INTERNAL FIXATION OF TRIMALLEOLAR FRACTURE (gen/block) *MINI C ARM* (R) Operative Note     Date: 10/2/2024  OR Location: [facility]    Name: Courtney Ace : 1985, Age: 39 y.o., MRN: [id number], Sex: female    Diagnosis  Pre-op Diagnosis      * Closed trimalleolar fracture of right ankle, initial encounter [S82.221A] Post-op Diagnosis     * Closed trimalleolar fracture of right ankle, initial encounter [S82.851A]     Procedures  RIGHT ANKLE OPEN REDUCTION INTERNAL FIXATION OF TRIMALLEOLAR FRACTURE (gen/block) *MINI C ARM*  84148 - [place] OPEN TX TRIMALLEOLAR ANKLE FX W/O FIXJ PST LIP      Surgeons      * Moris Canada - Primary    Resident/Fellow/Other Assistant:  Surgeons and Role:  * No surgeons found with a matching role *    Procedure Summary  Anesthesia: Regional, General  ASA: II  Anesthesia Staff: CRNA: KENYON Heck-CRNA  Estimated Blood Loss: 5  mL  Intra-op Medications: * Intraprocedure medication information is unavailable because the case start and end events have not been set *           Anesthesia Record               Intraprocedure I/O Totals          Intake    lactated Ringer's infusion 1000.00 mL    Total Intake 1000 mL          Specimen: No specimens collected     Staff:   Circulator: Juan J Pierre Person: Lola         Drains and/or Catheters: * None in log *    Tourniquet Times:     Total Tourniquet Time Documented:  Leg (Right) - 66 minutes  Total: Leg (Right) - 66 minutes      Implants:  Implants       Type Name Action Serial No.      Screw SCREW, CORTICAL, SELF-TAPPING, 3.5 X 16 MM, STAINLESS STEEL - [id number] Implanted      Screw SCREW, CORTICAL, SELF-TAPPING, 3.5 X 12 MM, STAINLESS STEEL - [id number] Implanted      Screw PLATE LCP TUBULAR 1/3 81MM 7H - [id number] Implanted      Screw SCREW, CANCELLOUS, FULL THREAD, 4 X 18 MM, STAINLESS STEEL - [id number] Implanted      Screw SCREW LOCKING 3.5 W/RECESS 12 - [id number] Implanted      Screw SCREW LOCKING 3.5  W/RECESS 16 - [id number] Implanted      Screw SCREW, CANNULATED, LONG THREAD, 4 X 44 MM, STAINLESS STEEL - [id number] Implanted               Findings: Anatomically reduced fracture with stable fixation    Indications: Courtney Ace is an 39 y.o. female who is having surgery for Closed trimalleolar fracture of right ankle, initial encounter [S82.902G].  A preop informed consent was obtained from the patient    The patient was seen in the preoperative area. The risks, benefits, complications, treatment options, non-operative alternatives, expected recovery and outcomes were discussed with the patient. The possibilities of reaction to medication, pulmonary aspiration, injury to surrounding structures, bleeding, recurrent infection, the need for additional procedures, failure to diagnose a condition, and creating a complication requiring transfusion or operation were discussed with the patient. The patient concurred with the proposed plan, giving informed consent.  The site of surgery was properly noted/marked if necessary per policy. The patient has been actively warmed in preoperative area. Preoperative antibiotics have been ordered and given within 1 hours of incision. Venous thrombosis prophylaxis are not indicated.    Procedure Details: Procedure Details:Procedure Details: Patient brought to the OR and placed supine on the OR table.  Patient was then induced under general anesthetic and given an IV antibiotic.  The operative lower extremity was sterilely prepped and draped in the usual manner both below a thigh tourniquet.  At the start of the case we exsanguinated the extremity with an Esmarch and elevated the tourniquet to 300 mmHg.  With a 15 blade a longitudinal incision was made over the lateral malleolus/distal fibula.  Appropriate length to accommodate fixation with a plate.  After sharp dissection through the dermis careful blunt dissection to the subcutaneous plane identified the fascia overlying the  peroneal muscles and tendons.  This was then incised taking care to protect the peroneal nerve and deep retractors were placed.  Leaving the fascia intact to the distal fibula we then exposed the lateral malleolus fracture and cleaned it of clot and periosteal tissue to fully expose the fracture line.  While maintaining some traction and internal rotation we then anatomically reduced the fracture and held it stably with clamps.  We placed an anterior to posterior directed 3 5 cortex lag screw, using the usual AO technique, to secure the fracture.  We remove the clamps and then fitted a one third tubular plate in the posterior lateral antiglide position.  Screws were then placed in the plate in the usual neutralization technique per AO.  We then obtained mini C arm images and noted anatomic reduction of the fracture and stable internal fixation.  We then turned our attention to the medial ankle where a 4 to 6 cm curvilinear incision was made over the medial malleolus.  After sharp dissection of the dermis blunt dissection brought us down to the periosteum over the medial mall fracture.  This thick fascia and periosteum was taken down along the fracture site to fully identify it.  We rotated the mall fracture away from the talus and irrigated out the joint and inspected and did note some small medial talar dome abrasions of the Cartilage.  Under mini C arm guidance we then did an anatomic reduction and fixation of the medial malleolus with 2 parallel 4.0 Synthes cannulated screws.  We also did a cotton/external rotation stress test under C arm and noted a stable syndesmosis/mortise.  Under fluoroscopy we identified the small posterior malleolus fracture and it was reduced by ligamentotaxis by fixing the medial and lateral fractures.  Along with a stable syndesmosis we elected not to fix the posterior mall.  We then copiously irrigated the surgical site with saline.  We closed in layers the fascial layer with a running  2-0 Vicryl.  The subdermal layer with interrupted buried 2-0 Vicryl.  And the skin with vertical mattress 3-0 nylon.  A sterile dressing was then applied along with a very well-padded posterior fiberglass splint maintaining the foot and ankle in neutral.  Tourniquet was let down at the end of the case and good perfusion was noted to the extremity.  Patient was then awoken in stable condition and transferred to recovery.    Complications:  None; patient tolerated the procedure well.    Disposition: PACU - hemodynamically stable.  Condition: stable         Additional Details: None    Attending Attestation: I was present and scrubbed for the entire procedure.    Moris Canada  Phone Number: [phone]

## 2024-10-02 NOTE — ANESTHESIA PROCEDURE NOTES
Peripheral Block    Patient location during procedure: pre-op  Start time: 10/2/2024 11:21 AM  End time: 10/2/2024 11:28 AM  Reason for block: procedure for pain, at surgeon's request and post-op pain management  Staffing  Performed: CRNA   Authorized by: ARLETH Mancilla    Performed by: ARLETH Mancilla  Preanesthetic Checklist  Completed: patient identified, IV checked, site marked, risks and benefits discussed, surgical consent, monitors and equipment checked, pre-op evaluation and timeout performed   Timeout performed at: 10/2/2024 11:14 AM  Peripheral Block  Patient position: PRONE.  Prep: ChloraPrep  Patient monitoring: heart rate, cardiac monitor and continuous pulse ox  Block type: popliteal  Laterality: right  Injection technique: single-shot  Guidance: nerve stimulator and ultrasound guided  Local infiltration: bupivicaine  Infiltration strength: 0.3 %  Dose: 20 mL  Needle  Needle gauge: 20 G  Needle localization: nerve stimulator and ultrasound guidance  Test dose: negative  Assessment  Injection assessment: negative aspiration for heme, no paresthesia on injection, incremental injection and local visualized surrounding nerve on ultrasound  Paresthesia pain: none  Heart rate change: no  Slow fractionated injection: yes  Additional Notes  ,Anesthesia consult was placed by Dr. Canada for post procedural analgesia. The patients chart was reviewed and they were deemed an appropriate candidate for the procedure. The patient was educated in detail on the risks, benefits, and alternatives to the block including but not limited to: temporary or permanent nerve damage, bleeding, and infection, damage to surrounding tissues, possible block failure, and the potential for local anesthesia toxicity syndrome. The patient expressed understanding and all questions were answered prior to initiation of the procedure. Informed consent was also signed by the patient and laterality determined per  "institutional policy. A formal \"time out\" consistent with the institutions rules and regulations were performed by the anesthesia provider and appropriate RN.    Procedure  The patient was placed in the PRONE position. The RIGHT side was marked and skin prep applied and allowed to dry. Proper monitors were applied with oxygen. Sedation was provided by administering:    Versed 2 mg IV  Fentanyl 100 mcg IV    A high frequency linear ultrasound probe with probe cover was placed over the popliteal fossa and sciatic nerve with popliteal vascular structures identified using sterile coupling gel following institutional skin prep.  The popliteal vein was easily collapsible, and popliteal artery found to be grossly normal under color Doppler flow prior to the procedure. A 20g 4 inch stimuplex needle  was then advanced maintaining an in-plane visualization throughout the procedure, under ultrasound guidance from lateral to medial, to come to rest just deep to the sciatic neurovascular sheath at the level of the bifurcation, but avoiding contact of the common peroneal nerve. A positive motor response was visualized from the nerve stimulator. A loss of response was seen at 0.47  mAmp. Upon negative aspiration, 5ml 1% Lidocaine with epinephrine 1:100,000, 20ml 0.25% Bupivacaine with 4mg decadron preservative free was administered safely and cautiously around the nerve structure. Aspiration every 3-5 ml was done to avoid potential intravascular injection.  All injections were done without resistance and were free of blood.  The patient tolerated the procedure well without report of intense pain, tinnitus, metallic taste, or circumoral numbness.  The block was then evaluated after a few minutes and appeared to be functioning appropriately.                "

## 2024-10-02 NOTE — ANESTHESIA PREPROCEDURE EVALUATION
Patient: Courtney Ace    Procedure Information       Date/Time: 10/02/24 1200    Procedure: RIGHT ANKLE OPEN REDUCTION INTERNAL FIXATION OF TRIMALLEOLAR FRACTURE (gen/block) *MINI C ARM* (Right: Ankle) - GENERAL/BLOCK, 75 MINUTES, MIINI C-ARM, SMALL FRAG SET, 4.0 CANULATED SCREWS    Location: [facility] / [facility]    Surgeons: Moris Canada MD            Relevant Problems   Anesthesia   (+) PONV (postoperative nausea and vomiting)      Neuro   (+) GRUPO (generalized anxiety disorder)      GI   (+) Rectal bleeding      Liver (within normal limits)      Endocrine (within normal limits)       Clinical information reviewed:   Tobacco  Allergies  Meds  Problems  Med Hx  Surg Hx  OB Status    Fam Hx  Soc Hx        NPO Detail:  NPO/Void Status  Date of Last Liquid: 10/01/24  Time of Last Liquid: 2130  Date of Last Solid: 10/01/24  Time of Last Solid: 1800  Last Intake Type: Food; Clear fluids         Physical Exam    Airway  Mallampati: II  TM distance: >3 FB  Neck ROM: full     Cardiovascular   Rhythm: regular  Rate: normal     Dental    Pulmonary - normal exam     Abdominal            Anesthesia Plan    History of general anesthesia?: yes  History of complications of general anesthesia?: yes    ASA 2     general and regional     The patient is not a current smoker.  Patient was not previously instructed to abstain from smoking on day of procedure.  Patient did not smoke on day of procedure.    intravenous induction   Postoperative administration of opioids is intended.  Trial extubation is planned.  Anesthetic plan and risks discussed with patient.  Use of blood products discussed with patient who consented to blood products.    Plan discussed with CRNA.

## 2024-10-02 NOTE — ANESTHESIA PROCEDURE NOTES
Airway  Date/Time: 10/2/2024 12:36 PM  Urgency: elective      Staffing  Performed: CRNA   Authorized by: ARLETH Heck    Performed by: ARLETH Heck  Patient location during procedure: OR    Indications and Patient Condition  Indications for airway management: anesthesia  Spontaneous ventilation: present  Sedation level: deep  Preoxygenated: yes  Patient position: sniffing  Mask difficulty assessment: 1 - vent by mask    Final Airway Details  Final airway type: supraglottic airway      Successful airway: classic  Size 4     Number of attempts at approach: 1

## 2024-10-02 NOTE — ANESTHESIA POSTPROCEDURE EVALUATION
Patient: Courtney Ace    Procedure Summary       Date: 10/02/24 Room / Location: [facility] OR 01 / [facility]    Anesthesia Start: 1229 Anesthesia Stop: 1403    Procedure: RIGHT ANKLE OPEN REDUCTION INTERNAL FIXATION OF TRIMALLEOLAR FRACTURE (gen/block) *MINI C ARM* (Right: Ankle) Diagnosis:       Closed trimalleolar fracture of right ankle, initial encounter      (Closed trimalleolar fracture of right ankle, initial encounter [S82.852J])    Surgeons: Moris Canada MD Responsible Provider: ARLETH Heck    Anesthesia Type: general, regional ASA Status: 2            Anesthesia Type: general, regional    Vitals Value Taken Time   /80 10/02/24 1445   Temp 36.6 °C (97.8 °F) 10/02/24 1402   Pulse 73 10/02/24 1449   Resp 23 10/02/24 1449   SpO2 83 % 10/02/24 1423   Vitals shown include unfiled device data.    Anesthesia Post Evaluation    Patient location during evaluation: PACU  Patient participation: complete - patient participated  Level of consciousness: awake and alert  Pain score: 0  Pain management: adequate  Multimodal analgesia pain management approach  Airway patency: patent  Cardiovascular status: blood pressure returned to baseline  Respiratory status: room air  Hydration status: acceptable  Postoperative Nausea and Vomiting: none    No notable events documented.

## 2024-10-02 NOTE — ANESTHESIA PROCEDURE NOTES
Peripheral Block    Patient location during procedure: pre-op  Start time: 10/2/2024 11:31 AM  End time: 10/2/2024 11:34 AM  Reason for block: procedure for pain, at surgeon's request and post-op pain management  Staffing  Performed: CRNA   Authorized by: ARLETH Mancilla    Performed by: ARLETH Mancilla  Preanesthetic Checklist  Completed: patient identified, IV checked, site marked, risks and benefits discussed, surgical consent, monitors and equipment checked, pre-op evaluation and timeout performed   Timeout performed at: 10/2/2024 11:14 AM  Peripheral Block  Patient position: laying flat  Prep: ChloraPrep  Patient monitoring: heart rate, cardiac monitor and continuous pulse ox  Block type: saphenous  Laterality: right  Injection technique: single-shot  Guidance: ultrasound guided  Local infiltration: bupivicaine  Infiltration strength: 0.3 %  Dose: 10 mL  Needle  Needle gauge: 20 G  Needle localization: ultrasound guidance  Test dose: negative  Assessment  Injection assessment: negative aspiration for heme, no paresthesia on injection, incremental injection and local visualized surrounding nerve on ultrasound  Paresthesia pain: none  Heart rate change: no  Slow fractionated injection: yes  Additional Notes  Block requested by surgeon. Risks benefits discussed.patient agreeable to saphenous nerve block with ultrasound guidance at level of the ankle. Site marked. Time out complete. Sterile prep and drape. Sedation administered as stated in previous block note. Ultrasound guided. 20g 4 inch stimuplex needle used. Attempt x1 needle tip visualized entire procedure. Anatomyid. 5 ml 1%lidocaine with epinephrine 1:100,000 10 ml 0.25% bupivacaine with 4mg decadron preservative free administered in divided does. Aspiration every 3-5ml. Negative heme negative paresthesia. Pt tolerated procedure well.

## 2024-10-16 ENCOUNTER — OFFICE VISIT (OUTPATIENT)
Dept: ORTHOPEDIC SURGERY | Facility: HOSPITAL | Age: 39
End: 2024-10-16
Payer: COMMERCIAL

## 2024-10-16 DIAGNOSIS — S82.851A CLOSED TRIMALLEOLAR FRACTURE OF RIGHT ANKLE, INITIAL ENCOUNTER: Primary | ICD-10-CM

## 2024-10-16 PROCEDURE — 99211 OFF/OP EST MAY X REQ PHY/QHP: CPT | Performed by: SPECIALIST

## 2024-10-16 NOTE — PROGRESS NOTES
Patient is here for reassessment after ORIF of right ankle fracture.  Date of surgery was 10-20 24.  She states she has been doing well she is nonweightbearing in her splint.  No other constitutional symptoms.    Exam: Right ankle with intact incisions well-healed moderate swelling normal neurovascular status.  No signs of infection.  Negative Homans.    Assessment plan: Healing right by mall ankle fracture.  Her sutures removed today and Steri-Strips applied.  She is placed into a cam fracture boot.  She can partial weight-bear in the boot and remove the boot at rest for range of motion active only.  She will follow-up in 2 weeks for 3 views nonweightbearing x-rays right ankle, and then progressed to weightbearing in the boot.

## 2024-10-23 DIAGNOSIS — S82.851A CLOSED TRIMALLEOLAR FRACTURE OF RIGHT ANKLE, INITIAL ENCOUNTER: ICD-10-CM

## 2024-10-30 ENCOUNTER — OFFICE VISIT (OUTPATIENT)
Dept: ORTHOPEDIC SURGERY | Facility: HOSPITAL | Age: 39
End: 2024-10-30
Payer: COMMERCIAL

## 2024-10-30 ENCOUNTER — HOSPITAL ENCOUNTER (OUTPATIENT)
Dept: RADIOLOGY | Facility: HOSPITAL | Age: 39
Discharge: HOME | End: 2024-10-30
Payer: COMMERCIAL

## 2024-10-30 VITALS — HEIGHT: 68 IN | WEIGHT: 190 LBS | BODY MASS INDEX: 28.79 KG/M2

## 2024-10-30 DIAGNOSIS — S82.851A CLOSED TRIMALLEOLAR FRACTURE OF RIGHT ANKLE, INITIAL ENCOUNTER: ICD-10-CM

## 2024-10-30 DIAGNOSIS — S82.851A CLOSED TRIMALLEOLAR FRACTURE OF RIGHT ANKLE, INITIAL ENCOUNTER: Primary | ICD-10-CM

## 2024-10-30 PROCEDURE — 3008F BODY MASS INDEX DOCD: CPT | Performed by: SPECIALIST

## 2024-10-30 PROCEDURE — 99211 OFF/OP EST MAY X REQ PHY/QHP: CPT | Performed by: SPECIALIST

## 2024-10-30 PROCEDURE — 73610 X-RAY EXAM OF ANKLE: CPT | Mod: RT

## 2024-11-19 DIAGNOSIS — S82.851A CLOSED TRIMALLEOLAR FRACTURE OF RIGHT ANKLE, INITIAL ENCOUNTER: ICD-10-CM

## 2024-11-26 ENCOUNTER — APPOINTMENT (OUTPATIENT)
Dept: PRIMARY CARE | Facility: CLINIC | Age: 39
End: 2024-11-26
Payer: COMMERCIAL

## 2024-11-26 VITALS — WEIGHT: 194 LBS | DIASTOLIC BLOOD PRESSURE: 67 MMHG | BODY MASS INDEX: 29.5 KG/M2 | SYSTOLIC BLOOD PRESSURE: 115 MMHG

## 2024-11-26 DIAGNOSIS — E66.3 OVERWEIGHT (BMI 25.0-29.9): Primary | ICD-10-CM

## 2024-11-26 PROCEDURE — 99213 OFFICE O/P EST LOW 20 MIN: CPT | Performed by: FAMILY MEDICINE

## 2024-11-26 NOTE — PROGRESS NOTES
Subjective   Patient ID: Courtney Ace is a 39 y.o. female who presents for wt loss    HPI   Pt has gained 4 lb in the past mos even with life style change. Pt would like to try wegovy for wt loss.   Review of Systems    Objective   /67   Wt 88 kg (194 lb)   BMI 29.50 kg/m²     Physical Exam  NAD, well groomed, eyes: , No sclera icterus. lungs: CTA b/l, heart: RRR, no LE edema, fair  balance.   Assessment/Plan   Assessment & Plan  Overweight (BMI 25.0-29.9)  Recommend decrease in calorie intake, regular aerobic exercise with low fat and low cholesterol diet. Pay attention to calorie count.   1 gram of carb/protein has 4 kcal, 1 gram of fat has 9 lcak and eoth, 7 kcal. Will monitor weight  regularly. Wegovy is not indicated due to her bmi. Tremayne, plenity, topomax were discussed. Pt is considering. Fu prn

## 2024-11-26 NOTE — ASSESSMENT & PLAN NOTE
Recommend decrease in calorie intake, regular aerobic exercise with low fat and low cholesterol diet. Pay attention to calorie count.   1 gram of carb/protein has 4 kcal, 1 gram of fat has 9 lcak and eoth, 7 kcal. Will monitor weight  regularly. Wegovy is not indicated due to her bmi. Tremayne, plenity, topomax were discussed. Pt is considering. Fu prn

## 2024-11-27 ENCOUNTER — HOSPITAL ENCOUNTER (OUTPATIENT)
Dept: RADIOLOGY | Facility: HOSPITAL | Age: 39
Discharge: HOME | End: 2024-11-27
Payer: COMMERCIAL

## 2024-11-27 ENCOUNTER — OFFICE VISIT (OUTPATIENT)
Dept: ORTHOPEDIC SURGERY | Facility: HOSPITAL | Age: 39
End: 2024-11-27
Payer: COMMERCIAL

## 2024-11-27 VITALS — HEIGHT: 68 IN | WEIGHT: 194 LBS | BODY MASS INDEX: 29.4 KG/M2

## 2024-11-27 DIAGNOSIS — S82.851A CLOSED TRIMALLEOLAR FRACTURE OF RIGHT ANKLE, INITIAL ENCOUNTER: ICD-10-CM

## 2024-11-27 DIAGNOSIS — S82.851A CLOSED TRIMALLEOLAR FRACTURE OF RIGHT ANKLE, INITIAL ENCOUNTER: Primary | ICD-10-CM

## 2024-11-27 PROCEDURE — 73610 X-RAY EXAM OF ANKLE: CPT | Mod: RIGHT SIDE | Performed by: RADIOLOGY

## 2024-11-27 PROCEDURE — 99211 OFF/OP EST MAY X REQ PHY/QHP: CPT | Performed by: SPECIALIST

## 2024-11-27 PROCEDURE — 3008F BODY MASS INDEX DOCD: CPT | Performed by: SPECIALIST

## 2024-11-27 PROCEDURE — 1036F TOBACCO NON-USER: CPT | Performed by: SPECIALIST

## 2024-11-27 PROCEDURE — 73610 X-RAY EXAM OF ANKLE: CPT | Mod: RT

## 2024-11-27 NOTE — PROGRESS NOTES
Patient is here for reassessment after ORIF of right ankle fracture. Date of surgery was 10-02-24. XR done today   Patient is doing well overall    Exam: Right ankle with well-healed incisions and normal alignment minimal swelling.  Good active and passive range of motion with good stability minimal pain.  Dermis intact neurovascular intact.  Negative Homans.    Radiographs: 3 view standing right ankle show stable ankle mortise intact hardware and excellent bone healing.    Assessment plan: Status post ORIF ankle fracture healing well.  She can start to wean from the boot as tolerated and work with physical therapy to regain strength and mobility.  Reassess in 6 weeks no x-rays if doing well.

## 2025-01-07 ENCOUNTER — OFFICE VISIT (OUTPATIENT)
Dept: ORTHOPEDIC SURGERY | Facility: HOSPITAL | Age: 40
End: 2025-01-07
Payer: COMMERCIAL

## 2025-01-07 VITALS — BODY MASS INDEX: 29.4 KG/M2 | WEIGHT: 194 LBS | HEIGHT: 68 IN

## 2025-01-07 DIAGNOSIS — S82.891D CLOSED FRACTURE DISLOCATION OF RIGHT ANKLE WITH ROUTINE HEALING, SUBSEQUENT ENCOUNTER: Primary | ICD-10-CM

## 2025-01-07 PROCEDURE — 99213 OFFICE O/P EST LOW 20 MIN: CPT | Performed by: SPECIALIST

## 2025-01-07 PROCEDURE — 1036F TOBACCO NON-USER: CPT | Performed by: SPECIALIST

## 2025-01-07 PROCEDURE — 3008F BODY MASS INDEX DOCD: CPT | Performed by: SPECIALIST

## 2025-01-07 NOTE — PROGRESS NOTES
Patient is here for reassessment after ORIF of right ankle fracture. Date of surgery was 10-02-24   Patient is doing well no concerns    Exam: Right ankle with well-healed incision minimal swelling no ecchymosis no erythema.  Dermis intact distal neurovascular status intact negative Homans.  5 degrees dorsiflexion 45 degrees plantarflexion.  Normal stability and no pain to stress testing.  No pain to palpation.    Assessment plan: Excellent progressive healing and rehab of her right ankle fracture.  We described the full timeframe for full recovery to high-impact activities.  Will do a final assessment in 3 months no x-rays if doing well.

## 2025-02-03 ENCOUNTER — TELEPHONE (OUTPATIENT)
Dept: PRIMARY CARE | Facility: CLINIC | Age: 40
End: 2025-02-03
Payer: COMMERCIAL

## 2025-02-03 DIAGNOSIS — F41.1 GAD (GENERALIZED ANXIETY DISORDER): Primary | ICD-10-CM

## 2025-02-03 RX ORDER — SERTRALINE HYDROCHLORIDE 50 MG/1
50 TABLET, FILM COATED ORAL DAILY
Qty: 30 TABLET | Refills: 0 | Status: SHIPPED | OUTPATIENT
Start: 2025-02-03 | End: 2025-04-04

## 2025-02-03 NOTE — TELEPHONE ENCOUNTER
Pt stated that at last appt you discussed switching from effexor to zoloft. Pt is requesting to make the switch.

## 2025-02-26 DIAGNOSIS — S82.891D CLOSED FRACTURE DISLOCATION OF RIGHT ANKLE WITH ROUTINE HEALING, SUBSEQUENT ENCOUNTER: ICD-10-CM

## 2025-02-26 DIAGNOSIS — S82.851A CLOSED TRIMALLEOLAR FRACTURE OF RIGHT ANKLE, INITIAL ENCOUNTER: ICD-10-CM

## 2025-03-04 ENCOUNTER — APPOINTMENT (OUTPATIENT)
Dept: PRIMARY CARE | Facility: CLINIC | Age: 40
End: 2025-03-04
Payer: COMMERCIAL

## 2025-03-04 VITALS — SYSTOLIC BLOOD PRESSURE: 100 MMHG | DIASTOLIC BLOOD PRESSURE: 62 MMHG

## 2025-03-04 DIAGNOSIS — F41.1 GAD (GENERALIZED ANXIETY DISORDER): ICD-10-CM

## 2025-03-04 PROCEDURE — 99213 OFFICE O/P EST LOW 20 MIN: CPT | Performed by: FAMILY MEDICINE

## 2025-03-04 PROCEDURE — 1036F TOBACCO NON-USER: CPT | Performed by: FAMILY MEDICINE

## 2025-03-04 RX ORDER — SERTRALINE HYDROCHLORIDE 25 MG/1
25 TABLET, FILM COATED ORAL DAILY
Qty: 90 TABLET | Refills: 1 | Status: SHIPPED | OUTPATIENT
Start: 2025-03-04 | End: 2026-03-04

## 2025-03-04 ASSESSMENT — PATIENT HEALTH QUESTIONNAIRE - PHQ9
1. LITTLE INTEREST OR PLEASURE IN DOING THINGS: NOT AT ALL
2. FEELING DOWN, DEPRESSED OR HOPELESS: NOT AT ALL
SUM OF ALL RESPONSES TO PHQ9 QUESTIONS 1 AND 2: 0

## 2025-03-04 NOTE — ASSESSMENT & PLAN NOTE
controlled with zoloft. Cont. the same. f/u in 6 mos    Orders:    sertraline (Zoloft) 25 mg tablet; Take 1 tablet (25 mg) by mouth once daily.

## 2025-03-04 NOTE — PROGRESS NOTES
Subjective   Patient ID: Courtney Ace is a 39 y.o. female who presents for No chief complaint on file..    HPI   Grupo was controlled most days. no depression  Review of Systems    Objective   /62     Physical Exam  NAD, well groomed, No sclera icterus.  lungs: CTA b/l, heart: RRR, no LE edema,   Good balance. No depressed mood    Assessment/Plan   Assessment & Plan  GRUPO (generalized anxiety disorder)  controlled with zoloft. Cont. the same. f/u in 6 mos    Orders:    sertraline (Zoloft) 25 mg tablet; Take 1 tablet (25 mg) by mouth once daily.

## 2025-03-11 DIAGNOSIS — S82.891D CLOSED FRACTURE DISLOCATION OF RIGHT ANKLE WITH ROUTINE HEALING, SUBSEQUENT ENCOUNTER: ICD-10-CM

## 2025-03-18 ENCOUNTER — OFFICE VISIT (OUTPATIENT)
Dept: ORTHOPEDIC SURGERY | Facility: HOSPITAL | Age: 40
End: 2025-03-18
Payer: COMMERCIAL

## 2025-03-18 ENCOUNTER — HOSPITAL ENCOUNTER (OUTPATIENT)
Dept: RADIOLOGY | Facility: HOSPITAL | Age: 40
Discharge: HOME | End: 2025-03-18
Payer: COMMERCIAL

## 2025-03-18 VITALS — WEIGHT: 194 LBS | HEIGHT: 68 IN | BODY MASS INDEX: 29.4 KG/M2

## 2025-03-18 DIAGNOSIS — S82.891D CLOSED FRACTURE DISLOCATION OF RIGHT ANKLE WITH ROUTINE HEALING, SUBSEQUENT ENCOUNTER: ICD-10-CM

## 2025-03-18 DIAGNOSIS — S82.891D CLOSED FRACTURE DISLOCATION OF RIGHT ANKLE WITH ROUTINE HEALING, SUBSEQUENT ENCOUNTER: Primary | ICD-10-CM

## 2025-03-18 PROCEDURE — 73610 X-RAY EXAM OF ANKLE: CPT | Mod: RT

## 2025-03-18 PROCEDURE — 3008F BODY MASS INDEX DOCD: CPT | Performed by: SPECIALIST

## 2025-03-18 PROCEDURE — 99213 OFFICE O/P EST LOW 20 MIN: CPT | Performed by: SPECIALIST

## 2025-03-18 NOTE — PROGRESS NOTES
Follow Up  after ORIF of right ankle fracture. Date of surgery was 10-02-24   XR Done Today   Patient is having some pain in ankle. She is able to bear weight on the foot.    Hand: Right ankle with minimal swelling no ecchymosis no erythema.  Dermis intact neurovascular intact minimal pain to the malleolar regions.  And stable stress testing today.  In stance neutral alignment with just mild swelling.  Slightly restricted dorsiflexion plantarflexion typical for timeframe.    Radiographs: Standing 3 views taken today shows a stable ankle mortise intact hardware and well-healed fractures.    Assessment plan: Healing Tri mall ankle fracture.  I told her for her timeframe some of her symptoms are very typical as she tries to increase her activity levels.  Will do a final follow-up in 3 months, noting that she can still improve for up to a year after surgery, no x-rays at next visit if doing well.

## 2025-04-08 ENCOUNTER — APPOINTMENT (OUTPATIENT)
Dept: ORTHOPEDIC SURGERY | Facility: HOSPITAL | Age: 40
End: 2025-04-08
Payer: COMMERCIAL

## 2025-06-17 ENCOUNTER — OFFICE VISIT (OUTPATIENT)
Dept: ORTHOPEDIC SURGERY | Facility: HOSPITAL | Age: 40
End: 2025-06-17
Payer: COMMERCIAL

## 2025-06-17 VITALS — WEIGHT: 194 LBS | HEIGHT: 68 IN | BODY MASS INDEX: 29.4 KG/M2

## 2025-06-17 DIAGNOSIS — S82.851D CLOSED TRIMALLEOLAR FRACTURE OF RIGHT ANKLE WITH ROUTINE HEALING, SUBSEQUENT ENCOUNTER: Primary | ICD-10-CM

## 2025-06-17 PROCEDURE — 1036F TOBACCO NON-USER: CPT | Performed by: SPECIALIST

## 2025-06-17 PROCEDURE — 99212 OFFICE O/P EST SF 10 MIN: CPT | Performed by: SPECIALIST

## 2025-06-17 PROCEDURE — 3008F BODY MASS INDEX DOCD: CPT | Performed by: SPECIALIST

## 2025-06-17 PROCEDURE — 99213 OFFICE O/P EST LOW 20 MIN: CPT | Performed by: SPECIALIST

## 2025-06-17 NOTE — PROGRESS NOTES
Follow Up  after ORIF of right ankle fracture. Date of surgery was 10-02-24     Patient doing well no concerns.  She has pretty much returned to all activities except high-impact, which she believes she can do but has some hesitation.    Exam: Lateral right ankle with remote well-healed incisions.  5 degrees dorsiflexion 40 degrees plantarflexion with no crepitus and pain.  Stable to all stress testing.  Dermis intact neurovascular intact negative Homans.    Assessment plan: Status post ORIF right ankle fracture doing excellent.  Can resume all activities to include high impact as tolerated.  Follow-up if

## (undated) DEVICE — SYRINGE, 20 CC, LUER LOCK, MONOJECT, W/O CAP, LF

## (undated) DEVICE — BIT, DRILL, CANNULATED, QUICK-COUPLING, 2.7 X 160 MM, STAINLESS STEEL

## (undated) DEVICE — DRESSING, GAUZE, PETROLATUM, STRIP, XEROFORM, 1 X 8 IN, STERILE

## (undated) DEVICE — DRAPE, INCISE, ANTIMICROBIAL, IOBAN 2, LARGE, 17 X 23 IN, DISPOSABLE, STERILE

## (undated) DEVICE — SPONGE, LAP, XRAY DECT, 18IN X 18IN, W/MASTER DMT, STERILE

## (undated) DEVICE — NEEDLE, HYPODERMIC, REGULAR WALL, REGULAR BEVEL, 22 G X 1.5 IN

## (undated) DEVICE — Device

## (undated) DEVICE — BIT, DRILL, QUICK-COUPLING, 2.5 X 110 MM, STAINLESS STEEL, GOLD

## (undated) DEVICE — DRESSING, ABDOMINAL, WET PRUF, TENDERSORB, 5 X 9 IN, STERILE

## (undated) DEVICE — TAPE, CAST, SCOTCHCAST, PLUS, 4 IN X 4 YD, FIBERGLASS, WHITE

## (undated) DEVICE — CUFF, TOURNIQUET, DUAL PORT, SNG BLADDER, 34 IN, PLC

## (undated) DEVICE — DRAPE, C-ARM, FLUROSCAN, MINI

## (undated) DEVICE — BIT, DRILL, QUICK-COUPLING, 3.5 X 110 MM, STAINLESS STEEL

## (undated) DEVICE — SPONGE, GAUZE, XRAY DECT, 16 PLY, 4 X 4, W/MASTER DMT,STERILE

## (undated) DEVICE — APPLICATOR, CHLORAPREP, W/ORANGE TINT, 26ML

## (undated) DEVICE — SUTURE, VICRYL, 4-0, 18 IN, PS2, UNDYED

## (undated) DEVICE — PADDING, CAST, SOFT, 4 IN

## (undated) DEVICE — DRAPE, SHEET, THREE QUARTER, FAN FOLD, 57 X 77 IN

## (undated) DEVICE — SYRINGE, 10 CC, LUER LOCK

## (undated) DEVICE — SUTURE, VICRYL, 0, 27 IN, CT-1, UNDYED

## (undated) DEVICE — DRILL, 2.8 X 165

## (undated) DEVICE — SOLUTION, SALINE, 100ML

## (undated) DEVICE — SUTURE, ETHILON, 3-0, 18 IN, PS1, BLACK

## (undated) DEVICE — GLOVE, SURGICAL, PROTEXIS PI BLUE W/NEUTHERA, 8.0, PF, LF

## (undated) DEVICE — GUIDEWIRE, THREADED, SPADE POINT, ONE END, 1.25 X 150 MM, STAINLESS STEEL

## (undated) DEVICE — GLOVE, SURGICAL, PROTEXIS PI , 8.0, PF, LF

## (undated) DEVICE — COVER HANDLE LIGHT, STERIS, BLUE, STERILE

## (undated) DEVICE — TOWEL PACK, STERILE, 4/PACK, BLUE

## (undated) DEVICE — DRAPE, SHEET, U, STERI DRAPE, 47 X 51 IN, DISPOSABLE, STERILE

## (undated) DEVICE — BANDAGE, COMPRESSION, EZE-BAND, 4 X 11 YDS